# Patient Record
Sex: MALE | URBAN - METROPOLITAN AREA
[De-identification: names, ages, dates, MRNs, and addresses within clinical notes are randomized per-mention and may not be internally consistent; named-entity substitution may affect disease eponyms.]

---

## 2023-06-22 ENCOUNTER — INPATIENT (INPATIENT)
Facility: HOSPITAL | Age: 58
LOS: 0 days | Discharge: HOME | DRG: 287 | End: 2023-06-23
Attending: INTERNAL MEDICINE | Admitting: INTERNAL MEDICINE
Payer: MEDICAID

## 2023-06-22 VITALS
HEART RATE: 68 BPM | RESPIRATION RATE: 17 BRPM | OXYGEN SATURATION: 98 % | DIASTOLIC BLOOD PRESSURE: 82 MMHG | SYSTOLIC BLOOD PRESSURE: 127 MMHG | WEIGHT: 156.53 LBS

## 2023-06-22 DIAGNOSIS — R07.9 CHEST PAIN, UNSPECIFIED: ICD-10-CM

## 2023-06-22 LAB
ALBUMIN SERPL ELPH-MCNC: 4.1 G/DL — SIGNIFICANT CHANGE UP (ref 3.3–5)
ALP SERPL-CCNC: 124 U/L — HIGH (ref 40–120)
ALT FLD-CCNC: 38 U/L — SIGNIFICANT CHANGE UP (ref 10–45)
ANION GAP SERPL CALC-SCNC: 9 MMOL/L — SIGNIFICANT CHANGE UP (ref 5–17)
APTT BLD: 31.2 SEC — SIGNIFICANT CHANGE UP (ref 27.5–35.5)
AST SERPL-CCNC: 28 U/L — SIGNIFICANT CHANGE UP (ref 10–40)
BASOPHILS # BLD AUTO: 0.07 K/UL — SIGNIFICANT CHANGE UP (ref 0–0.2)
BASOPHILS NFR BLD AUTO: 1 % — SIGNIFICANT CHANGE UP (ref 0–2)
BILIRUB SERPL-MCNC: 0.4 MG/DL — SIGNIFICANT CHANGE UP (ref 0.2–1.2)
BUN SERPL-MCNC: 12 MG/DL — SIGNIFICANT CHANGE UP (ref 7–23)
CALCIUM SERPL-MCNC: 9.6 MG/DL — SIGNIFICANT CHANGE UP (ref 8.4–10.5)
CHLORIDE SERPL-SCNC: 102 MMOL/L — SIGNIFICANT CHANGE UP (ref 96–108)
CO2 SERPL-SCNC: 28 MMOL/L — SIGNIFICANT CHANGE UP (ref 22–31)
CREAT SERPL-MCNC: 0.93 MG/DL — SIGNIFICANT CHANGE UP (ref 0.5–1.3)
EGFR: 96 ML/MIN/1.73M2 — SIGNIFICANT CHANGE UP
EOSINOPHIL # BLD AUTO: 0.39 K/UL — SIGNIFICANT CHANGE UP (ref 0–0.5)
EOSINOPHIL NFR BLD AUTO: 5.4 % — SIGNIFICANT CHANGE UP (ref 0–6)
GLUCOSE BLDC GLUCOMTR-MCNC: 251 MG/DL — HIGH (ref 70–99)
GLUCOSE SERPL-MCNC: 109 MG/DL — HIGH (ref 70–99)
HCT VFR BLD CALC: 44.2 % — SIGNIFICANT CHANGE UP (ref 39–50)
HGB BLD-MCNC: 14.6 G/DL — SIGNIFICANT CHANGE UP (ref 13–17)
IMM GRANULOCYTES NFR BLD AUTO: 1.2 % — HIGH (ref 0–0.9)
INR BLD: 1.02 RATIO — SIGNIFICANT CHANGE UP (ref 0.88–1.16)
LYMPHOCYTES # BLD AUTO: 2.8 K/UL — SIGNIFICANT CHANGE UP (ref 1–3.3)
LYMPHOCYTES # BLD AUTO: 38.7 % — SIGNIFICANT CHANGE UP (ref 13–44)
MAGNESIUM SERPL-MCNC: 2 MG/DL — SIGNIFICANT CHANGE UP (ref 1.6–2.6)
MCHC RBC-ENTMCNC: 30.4 PG — SIGNIFICANT CHANGE UP (ref 27–34)
MCHC RBC-ENTMCNC: 33 GM/DL — SIGNIFICANT CHANGE UP (ref 32–36)
MCV RBC AUTO: 92.1 FL — SIGNIFICANT CHANGE UP (ref 80–100)
MONOCYTES # BLD AUTO: 0.61 K/UL — SIGNIFICANT CHANGE UP (ref 0–0.9)
MONOCYTES NFR BLD AUTO: 8.4 % — SIGNIFICANT CHANGE UP (ref 2–14)
NEUTROPHILS # BLD AUTO: 3.27 K/UL — SIGNIFICANT CHANGE UP (ref 1.8–7.4)
NEUTROPHILS NFR BLD AUTO: 45.3 % — SIGNIFICANT CHANGE UP (ref 43–77)
NRBC # BLD: 0 /100 WBCS — SIGNIFICANT CHANGE UP (ref 0–0)
NT-PROBNP SERPL-SCNC: 359 PG/ML — HIGH (ref 0–300)
PLATELET # BLD AUTO: 203 K/UL — SIGNIFICANT CHANGE UP (ref 150–400)
POTASSIUM SERPL-MCNC: 4.9 MMOL/L — SIGNIFICANT CHANGE UP (ref 3.5–5.3)
POTASSIUM SERPL-SCNC: 4.9 MMOL/L — SIGNIFICANT CHANGE UP (ref 3.5–5.3)
PROT SERPL-MCNC: 6.9 G/DL — SIGNIFICANT CHANGE UP (ref 6–8.3)
PROTHROM AB SERPL-ACNC: 11.7 SEC — SIGNIFICANT CHANGE UP (ref 10.5–13.4)
RBC # BLD: 4.8 M/UL — SIGNIFICANT CHANGE UP (ref 4.2–5.8)
RBC # FLD: 12.6 % — SIGNIFICANT CHANGE UP (ref 10.3–14.5)
SODIUM SERPL-SCNC: 139 MMOL/L — SIGNIFICANT CHANGE UP (ref 135–145)
TROPONIN T, HIGH SENSITIVITY RESULT: 24 NG/L — SIGNIFICANT CHANGE UP (ref 0–51)
WBC # BLD: 7.23 K/UL — SIGNIFICANT CHANGE UP (ref 3.8–10.5)
WBC # FLD AUTO: 7.23 K/UL — SIGNIFICANT CHANGE UP (ref 3.8–10.5)

## 2023-06-22 PROCEDURE — 93458 L HRT ARTERY/VENTRICLE ANGIO: CPT | Mod: 26

## 2023-06-22 PROCEDURE — 99152 MOD SED SAME PHYS/QHP 5/>YRS: CPT

## 2023-06-22 PROCEDURE — 99291 CRITICAL CARE FIRST HOUR: CPT

## 2023-06-22 RX ORDER — SODIUM CHLORIDE 9 MG/ML
1000 INJECTION, SOLUTION INTRAVENOUS
Refills: 0 | Status: DISCONTINUED | OUTPATIENT
Start: 2023-06-22 | End: 2023-06-23

## 2023-06-22 RX ORDER — HEPARIN SODIUM 5000 [USP'U]/ML
4000 INJECTION INTRAVENOUS; SUBCUTANEOUS EVERY 6 HOURS
Refills: 0 | Status: DISCONTINUED | OUTPATIENT
Start: 2023-06-22 | End: 2023-06-22

## 2023-06-22 RX ORDER — INSULIN LISPRO 100/ML
VIAL (ML) SUBCUTANEOUS AT BEDTIME
Refills: 0 | Status: DISCONTINUED | OUTPATIENT
Start: 2023-06-22 | End: 2023-06-23

## 2023-06-22 RX ORDER — BISOPROLOL FUMARATE 10 MG/1
0.5 TABLET, FILM COATED ORAL
Refills: 0 | DISCHARGE

## 2023-06-22 RX ORDER — OMEPRAZOLE 10 MG/1
1 CAPSULE, DELAYED RELEASE ORAL
Refills: 0 | DISCHARGE

## 2023-06-22 RX ORDER — DEXTROSE 50 % IN WATER 50 %
12.5 SYRINGE (ML) INTRAVENOUS ONCE
Refills: 0 | Status: DISCONTINUED | OUTPATIENT
Start: 2023-06-22 | End: 2023-06-23

## 2023-06-22 RX ORDER — DEXTROSE 50 % IN WATER 50 %
25 SYRINGE (ML) INTRAVENOUS ONCE
Refills: 0 | Status: DISCONTINUED | OUTPATIENT
Start: 2023-06-22 | End: 2023-06-23

## 2023-06-22 RX ORDER — HEPARIN SODIUM 5000 [USP'U]/ML
INJECTION INTRAVENOUS; SUBCUTANEOUS
Qty: 25000 | Refills: 0 | Status: DISCONTINUED | OUTPATIENT
Start: 2023-06-22 | End: 2023-06-22

## 2023-06-22 RX ORDER — SITAGLIPTIN 50 MG/1
1 TABLET, FILM COATED ORAL
Refills: 0 | DISCHARGE

## 2023-06-22 RX ORDER — ONDANSETRON 8 MG/1
4 TABLET, FILM COATED ORAL ONCE
Refills: 0 | Status: COMPLETED | OUTPATIENT
Start: 2023-06-22 | End: 2023-06-22

## 2023-06-22 RX ORDER — DEXTROSE 50 % IN WATER 50 %
15 SYRINGE (ML) INTRAVENOUS ONCE
Refills: 0 | Status: DISCONTINUED | OUTPATIENT
Start: 2023-06-22 | End: 2023-06-23

## 2023-06-22 RX ORDER — TICAGRELOR 90 MG/1
180 TABLET ORAL ONCE
Refills: 0 | Status: DISCONTINUED | OUTPATIENT
Start: 2023-06-22 | End: 2023-06-23

## 2023-06-22 RX ORDER — ASPIRIN/CALCIUM CARB/MAGNESIUM 324 MG
1 TABLET ORAL
Refills: 0 | DISCHARGE

## 2023-06-22 RX ORDER — HEPARIN SODIUM 5000 [USP'U]/ML
4000 INJECTION INTRAVENOUS; SUBCUTANEOUS ONCE
Refills: 0 | Status: DISCONTINUED | OUTPATIENT
Start: 2023-06-22 | End: 2023-06-22

## 2023-06-22 RX ORDER — ACETAMINOPHEN 500 MG
650 TABLET ORAL ONCE
Refills: 0 | Status: COMPLETED | OUTPATIENT
Start: 2023-06-22 | End: 2023-06-22

## 2023-06-22 RX ORDER — ATORVASTATIN CALCIUM 80 MG/1
80 TABLET, FILM COATED ORAL AT BEDTIME
Refills: 0 | Status: DISCONTINUED | OUTPATIENT
Start: 2023-06-22 | End: 2023-06-23

## 2023-06-22 RX ORDER — ASPIRIN/CALCIUM CARB/MAGNESIUM 324 MG
81 TABLET ORAL DAILY
Refills: 0 | Status: DISCONTINUED | OUTPATIENT
Start: 2023-06-22 | End: 2023-06-23

## 2023-06-22 RX ORDER — PANTOPRAZOLE SODIUM 20 MG/1
40 TABLET, DELAYED RELEASE ORAL
Refills: 0 | Status: DISCONTINUED | OUTPATIENT
Start: 2023-06-22 | End: 2023-06-23

## 2023-06-22 RX ORDER — INSULIN LISPRO 100/ML
VIAL (ML) SUBCUTANEOUS
Refills: 0 | Status: DISCONTINUED | OUTPATIENT
Start: 2023-06-22 | End: 2023-06-23

## 2023-06-22 RX ORDER — LOSARTAN POTASSIUM 100 MG/1
1 TABLET, FILM COATED ORAL
Refills: 0 | DISCHARGE

## 2023-06-22 RX ORDER — IBUPROFEN 200 MG
600 TABLET ORAL ONCE
Refills: 0 | Status: COMPLETED | OUTPATIENT
Start: 2023-06-22 | End: 2023-06-22

## 2023-06-22 RX ORDER — SODIUM CHLORIDE 9 MG/ML
250 INJECTION INTRAMUSCULAR; INTRAVENOUS; SUBCUTANEOUS ONCE
Refills: 0 | Status: COMPLETED | OUTPATIENT
Start: 2023-06-22 | End: 2023-06-22

## 2023-06-22 RX ORDER — LOSARTAN POTASSIUM 100 MG/1
50 TABLET, FILM COATED ORAL DAILY
Refills: 0 | Status: DISCONTINUED | OUTPATIENT
Start: 2023-06-22 | End: 2023-06-23

## 2023-06-22 RX ORDER — GLUCAGON INJECTION, SOLUTION 0.5 MG/.1ML
1 INJECTION, SOLUTION SUBCUTANEOUS ONCE
Refills: 0 | Status: DISCONTINUED | OUTPATIENT
Start: 2023-06-22 | End: 2023-06-23

## 2023-06-22 RX ADMIN — Medication 600 MILLIGRAM(S): at 18:46

## 2023-06-22 RX ADMIN — ONDANSETRON 4 MILLIGRAM(S): 8 TABLET, FILM COATED ORAL at 18:46

## 2023-06-22 RX ADMIN — Medication 600 MILLIGRAM(S): at 19:20

## 2023-06-22 RX ADMIN — Medication 650 MILLIGRAM(S): at 16:59

## 2023-06-22 RX ADMIN — SODIUM CHLORIDE 750 MILLILITER(S): 9 INJECTION INTRAMUSCULAR; INTRAVENOUS; SUBCUTANEOUS at 18:23

## 2023-06-22 RX ADMIN — ATORVASTATIN CALCIUM 80 MILLIGRAM(S): 80 TABLET, FILM COATED ORAL at 21:37

## 2023-06-22 RX ADMIN — Medication 1: at 21:38

## 2023-06-22 NOTE — ED ADULT NURSE NOTE - OBJECTIVE STATEMENT
Patient presents to Ed via amb with c/o chest pain. Per Ems patient has been experiencing chest pain x 1 month seen by Md today  and was sent to Ed due to STEMI. Patient A&Ox3 speaks Luxembourgish arrived with RAC 22g iv lock placed by RN at Md office. Patient given  4 baby aspirins and 2 nitro tabs per Ems. Patient arrived with Cardiology Md at bedside, LAC 18g iv lock placed labs drawn and sent.  Per Cardiology and Ed Md patient will receive Heparin bolus and drip by cath lab RN, medication at bedside given to Cardiologist.

## 2023-06-22 NOTE — H&P CARDIOLOGY - HISTORY OF PRESENT ILLNESS
58 year old Estonian speaking male with PMHx of CAD, prior MI in 1990s (medically managed), HTN, HLD, DM presented to ED with c/o intermittent left sided chest discomfort x2 months, radiating to left shoulder, worsening with exertion and with associated symptoms of dyspnea. Patient initially went to his cardiologist and in the office was found to have STEMI on EKG V3-V5 1-2 mm, given nitro x2 by EMS without improvement and sent for LHC. Patient is now s/p LHC showing mild LAD, RCA small diffuse disease via RRA. LVEDP 17, echo pending. Currently denies chest pain, palpitations, SOB, dizziness.    Cards:     ID:  58 year old male with PMHx of CAD, prior MI in 1990s (medically managed), HTN, HLD, DM presented to ED with c/o intermittent left sided chest discomfort radiating to his left shoulder x2 months, worsening with exertion and associated with symptoms of dyspnea. Patient initially went to his cardiologist Dr. Chen and in the office was found to have STEMI on EKG V3-V5 1-2 mm, given nitro x2 by EMS without improvement and transferred to [facility] ED for LHC. Patient is now s/p LHC showing mild LAD, RCA small diffuse disease via RRA, LVEDP 17, echo pending. Currently denies chest pain, palpitations, SOB, dizziness.    Cards: Angel Chen   58 year old male with PMHx of CAD, prior MI in 1990s (medically managed), HTN, HLD, DM presented to ED with c/o intermittent left sided chest discomfort radiating to his left shoulder x2 months, worsening with exertion and associated with symptoms of dyspnea. Patient initially went to his cardiologist Dr. Chen and in the office was found to have STEMI on EKG V3-V5 1-2 mm, given nitro x2 by EMS without improvement and transferred to [facility] ED for STEMI. Patient is now s/p LHC showing mild LAD, RCA small diffuse disease via RRA, LVEDP 17, echo pending. Currently denies chest pain, palpitations, SOB, dizziness.    Cards: Angel Chen

## 2023-06-22 NOTE — ED PROVIDER NOTE - OBJECTIVE STATEMENT
58-year-old male with past medical history of HTN, HLD, DM, CAD/MI (managed medically) presenting with chest pain.  Patient reports that he has had intermittent left-sided chest discomfort for 1 month chest pain has radiated to the left shoulder.  Over the past few days pain has been, constant.  Patient followed up with his cardiologist today and in the office was found to have a STEMI on EKG.  Patient given nitro x2 by EMS without improvement.  Patient otherwise denies fevers, shortness of breath, abdominal pain, nausea, vomiting, diarrhea, dysuria, diaphoresis.

## 2023-06-22 NOTE — ED PROVIDER NOTE - CRITICAL CARE ATTENDING CONTRIBUTION TO CARE
Hiren Girard MD:   I personally saw the patient and performed a substantive portion of the visit including all aspects of the medical decision making.    MDM: 58-year-old male with history as seen in HPI above, who was brought in by EMS from doctor's office for chest pain that is left-sided for 1 month, radiates to left shoulder, worsened over the last few days.  Cardiologist in office noted patient to have STEMI.  Patient was given nitroglycerin x2 and aspirin 324 mg prior to arrival.    ROS: patient denies fevers, chills, cough, shortness of breath, nausea, vomiting, diaphoresis, dizziness, syncope, leg pain/swelling, paresthesia, numbness, or weakness.    On examination, patient with elevated blood pressure, otherwise stable.  Cardiac RRR, lungs CTAB, abdomen soft nontender.  Neurovascular intact with equal pulses in all 4 extremities.  There is no calf tenderness or leg swelling. Neurovascularly intact bilaterally with soft compartments. Negative Cecille's sign.    Concern for STEMI, cardiology already at bedside upon arrival given prenotification by EMS.  Cardiology agreed with plan of care for Brilinta load and heparin load and drip.  Will obtain labs to evaluate for hematologic disorder, metabolic derangements, hepatic and renal function, and screen for infection. will obtain chest x-ray to evaluate for acute cardiopulmonary pathology.    The patient will need to be admitted to the hospital for continued evaluation and management.  Discussed with the accepting physician regarding the initial presentation, diagnostic studies, treatments given in the ED, and current plan of care.   The patient was accepted by and endorsed to the cardiology team, will follow up on pending labs and imaging.    Differential includes but is not limited to: See above    Patient with new problems requiring additional work-up and treatment, following orders: see above  Discussed case with: Cardiology  Obtained and reviewed external records: Outpatient notes from cardiologist  Additional history obtained from: EMS  Chronic conditions and social determinants of health affecting care: See above  Consideration of admission    Patient seen by myself and CEDRIC, but all critical care was provided by myself, the ED attending.     Critical Care Billing: STEMI  Upon my evaluation, this patient had a high probability of imminent or life-threatening deterioration, which required my direct attention, intervention, and personal management.  The patient has a  medical condition that impairs one or more vital organ systems.  Frequent personal assessment and adjustment of medical interventions was performed.      I have personally provided 35 minutes of critical care time exclusive of time spent on separately billable procedures. Time includes review of laboratory data, radiology results, discussion with consultants, patient and family; monitoring for potential decompensation, as well as time spent retrieving data and reviewing the chart and documenting the visit. Interventions were performed as documented above. Hiren Girard MD:   I personally saw the patient and performed a substantive portion of the visit including all aspects of the medical decision making.    Croatian Interpretation provided by [place] interpreters ID [id number]    MDM: 58-year-old male with history as seen in HPI above, who was brought in by EMS from doctor's office for chest pain that is left-sided for 1 month, radiates to left shoulder, worsened over the last few days.  Cardiologist in office noted patient to have STEMI.  Patient was given nitroglycerin x2 and aspirin 324 mg prior to arrival.    ROS: patient denies fevers, chills, cough, shortness of breath, nausea, vomiting, diaphoresis, dizziness, syncope, leg pain/swelling, paresthesia, numbness, or weakness.    On examination, patient with elevated blood pressure, otherwise stable.  Cardiac RRR, lungs CTAB, abdomen soft nontender.  Neurovascular intact with equal pulses in all 4 extremities.  There is no calf tenderness or leg swelling. Neurovascularly intact bilaterally with soft compartments. Negative Cecille's sign.    Concern for STEMI, cardiology already at bedside upon arrival given prenotification by EMS.  Cardiology agreed with plan of care for Brilinta load and heparin load and drip.  Will obtain labs to evaluate for hematologic disorder, metabolic derangements, hepatic and renal function, and screen for infection. will obtain chest x-ray to evaluate for acute cardiopulmonary pathology.    The patient will need to be admitted to the hospital for continued evaluation and management.  Discussed with the accepting physician regarding the initial presentation, diagnostic studies, treatments given in the ED, and current plan of care.   The patient was accepted by and endorsed to the cardiology team, will follow up on pending labs and imaging.    Differential includes but is not limited to: See above    Patient with new problems requiring additional work-up and treatment, following orders: see above  Discussed case with: Cardiology  Obtained and reviewed external records: Outpatient notes from cardiologist  Additional history obtained from: EMS  Chronic conditions and social determinants of health affecting care: See above  Consideration of admission    Patient seen by myself and CEDRIC, but all critical care was provided by myself, the ED attending.     Critical Care Billing: STEMI  Upon my evaluation, this patient had a high probability of imminent or life-threatening deterioration, which required my direct attention, intervention, and personal management.  The patient has a  medical condition that impairs one or more vital organ systems.  Frequent personal assessment and adjustment of medical interventions was performed.      I have personally provided 35 minutes of critical care time exclusive of time spent on separately billable procedures. Time includes review of laboratory data, radiology results, discussion with consultants, patient and family; monitoring for potential decompensation, as well as time spent retrieving data and reviewing the chart and documenting the visit. Interventions were performed as documented above. Hiren Girard MD:   I personally saw the patient and performed a substantive portion of the visit including all aspects of the medical decision making.    Pashto Interpretation provided by [place] interpreters ID [id number]    MDM: 58-year-old male with history as seen in HPI above, who was brought in by EMS from doctor's office for chest pain that is left-sided for 1 month, radiates to left shoulder, worsened over the last few days.  Cardiologist in office noted patient to have STEMI.  Patient was given nitroglycerin x2 and aspirin 324 mg prior to arrival.    ROS: patient denies fevers, chills, cough, shortness of breath, nausea, vomiting, diaphoresis, dizziness, syncope, leg pain/swelling, paresthesia, numbness, or weakness.    On examination, patient with elevated blood pressure, otherwise stable.  Cardiac RRR, lungs CTAB, abdomen soft nontender.  Neurovascular intact with equal pulses in all 4 extremities.  There is no calf tenderness or leg swelling. Neurovascularly intact bilaterally with soft compartments. Negative Cecille's sign.    Concern for STEMI, cardiology already at bedside upon arrival given prenotification by EMS.  Cardiology agreed with plan of care for Brilinta load and heparin load and drip.  Will obtain labs to evaluate for hematologic disorder, metabolic derangements, hepatic and renal function, and screen for infection. will obtain chest x-ray to evaluate for acute cardiopulmonary pathology.    My independent interpretation of the EKG shows:  Normal sinus rhythm with rate of 66 bpm, left axis deviation, (+) ST elevations noted, reciprocal ST depressions noted.    The patient will need to be admitted to the hospital for continued evaluation and management.  Discussed with the accepting physician regarding the initial presentation, diagnostic studies, treatments given in the ED, and current plan of care.   The patient was accepted by and endorsed to the cardiology team, will follow up on pending labs and imaging.    Differential includes but is not limited to: See above    Patient with new problems requiring additional work-up and treatment, following orders: see above  Discussed case with: Cardiology  Obtained and reviewed external records: Outpatient notes from cardiologist  Additional history obtained from: EMS  Chronic conditions and social determinants of health affecting care: See above  Consideration of admission    Patient seen by myself and CEDRIC, but all critical care was provided by myself, the ED attending.     Critical Care Billing: STEMI  Upon my evaluation, this patient had a high probability of imminent or life-threatening deterioration, which required my direct attention, intervention, and personal management.  The patient has a  medical condition that impairs one or more vital organ systems.  Frequent personal assessment and adjustment of medical interventions was performed.      I have personally provided 35 minutes of critical care time exclusive of time spent on separately billable procedures. Time includes review of laboratory data, radiology results, discussion with consultants, patient and family; monitoring for potential decompensation, as well as time spent retrieving data and reviewing the chart and documenting the visit. Interventions were performed as documented above.

## 2023-06-22 NOTE — ED ADULT NURSE NOTE - NSFALLRISKINTERV_ED_ALL_ED
Assistance OOB with selected safe patient handling equipment if applicable/Communicate fall risk and risk factors to all staff, patient, and family/Provide patient with walking aids/Provide visual cue: yellow wristband, yellow gown, etc/Reinforce activity limits and safety measures with patient and family/Call bell, personal items and telephone in reach/Instruct patient to call for assistance before getting out of bed/chair/stretcher/Non-slip footwear applied when patient is off stretcher/[place] to call system/Physically safe environment - no spills, clutter or unnecessary equipment/Purposeful Proactive Rounding/Room/bathroom lighting operational, light cord in reach

## 2023-06-22 NOTE — CONSULT NOTE ADULT - ASSESSMENT
59yo with PMHx HTN, HLD, Dm, prior MI in the 1990s (managed medically) who presents with 2 months of chest pain  Does not meet STEMI criteria but high risk and pretest probability     Impression:  Near STEMI criteria  HTN HLD DM     Plan:  Dapt load with aspirin brillinta  Hep bolus and gtt  atorva 80   BB and arb in the next 24h if hemodynamically favorable  TTE   A1c, lipid panel tsh for risk stratification  To Cath lab (Dr. Aguillon)

## 2023-06-22 NOTE — CONSULT NOTE ADULT - SUBJECTIVE AND OBJECTIVE BOX
Registration Time: 14:02  Initial EC:10pm  Called by ED: N/a  Saw patient at bedside: 1:50pm  Called Cath Attendin:00  Balloon/device time:    Reason for consult: STEMI    HPI:  Estonian speaking, telephone     57yo with PMHx HTN, HLD, Dm, prior MI in the  (managed medically) who presents with 2 months of chest pain. It is substernal worsening with exertion, with radiation down his left arm and associatd with dyspnea.  Saw his cardiologist earlier today and found to have STEMI on EKG V3-V5 1-2mm and thus triaged to [facility] ED    HR 68 -151/85 satting 100% Ra    PMHx/ as above    Home Meds:  Januvia, metformin  bisoprolol aspirin losartan  omeprazole     Current Meds:   heparin   Injectable 4000 Unit(s) IV Push once  heparin   Injectable 4000 Unit(s) IV Push every 6 hours PRN  heparin  Infusion.  Unit(s)/Hr IV Continuous <Continuous>  ticagrelor 180 milliGRAM(s) Oral Once    Allergies:  No Known Allergies    Review of Systems:14pt ros neg unless stated above    Physical Exam:  T(F): --  HR: 68 (06-22) (68 - 68)  BP: 151/85 (06-22) (127/82 - 151/85)  RR: 20 (06-22)  SpO2: 98% (-)  GENERAL: No acute distress, well-developed  HEAD:  Atraumatic, Normocephalic  ENT: EOMI, PERRLA, conjunctiva and sclera clear, Neck supple, No JVD, moist mucosa  CHEST/LUNG: Clear to auscultation bilaterally; No wheeze, equal breath sounds bilaterally   BACK: No spinal tenderness  HEART: Regular rate and rhythm; No murmurs, rubs, or gallops  ABDOMEN: Soft, Nontender, Nondistended; Bowel sounds present  EXTREMITIES:  No clubbing, cyanosis, or edema  PSYCH: Nl behavior, nl affect  NEUROLOGY: AAOx3, non-focal, cranial nerves intact  SKIN: Normal color, No rashes or lesions  LINES:    Cardiovascular Diagnostic Testing:    ECG: office:  sinus rhythm, normal axis, 1-2mm john in V3-V4 and TWI in leads V5-V6    Here  sinus rhythm, normal adxis, qbiphasic T wave in V2, TWI in the lateral leads                        14.6   7.23  )-----------(  14:38 )             44.2

## 2023-06-23 VITALS
SYSTOLIC BLOOD PRESSURE: 128 MMHG | RESPIRATION RATE: 17 BRPM | HEART RATE: 62 BPM | DIASTOLIC BLOOD PRESSURE: 70 MMHG | OXYGEN SATURATION: 98 % | TEMPERATURE: 98 F

## 2023-06-23 DIAGNOSIS — I21.3 ST ELEVATION (STEMI) MYOCARDIAL INFARCTION OF UNSPECIFIED SITE: ICD-10-CM

## 2023-06-23 DIAGNOSIS — I10 ESSENTIAL (PRIMARY) HYPERTENSION: ICD-10-CM

## 2023-06-23 DIAGNOSIS — E78.5 HYPERLIPIDEMIA, UNSPECIFIED: ICD-10-CM

## 2023-06-23 DIAGNOSIS — E11.9 TYPE 2 DIABETES MELLITUS WITHOUT COMPLICATIONS: ICD-10-CM

## 2023-06-23 LAB
A1C WITH ESTIMATED AVERAGE GLUCOSE RESULT: 8.8 % — HIGH (ref 4–5.6)
CHOLEST SERPL-MCNC: 154 MG/DL — SIGNIFICANT CHANGE UP
ESTIMATED AVERAGE GLUCOSE: 206 MG/DL — HIGH (ref 68–114)
GLUCOSE BLDC GLUCOMTR-MCNC: 132 MG/DL — HIGH (ref 70–99)
GLUCOSE BLDC GLUCOMTR-MCNC: 175 MG/DL — HIGH (ref 70–99)
GLUCOSE BLDC GLUCOMTR-MCNC: 95 MG/DL — SIGNIFICANT CHANGE UP (ref 70–99)
HDLC SERPL-MCNC: 36 MG/DL — LOW
LIPID PNL WITH DIRECT LDL SERPL: 92 MG/DL — SIGNIFICANT CHANGE UP
NON HDL CHOLESTEROL: 118 MG/DL — SIGNIFICANT CHANGE UP
TRIGL SERPL-MCNC: 127 MG/DL — SIGNIFICANT CHANGE UP
TSH SERPL-MCNC: 2.72 UIU/ML — SIGNIFICANT CHANGE UP (ref 0.27–4.2)

## 2023-06-23 PROCEDURE — 93306 TTE W/DOPPLER COMPLETE: CPT | Mod: 26

## 2023-06-23 PROCEDURE — 84443 ASSAY THYROID STIM HORMONE: CPT

## 2023-06-23 PROCEDURE — 99291 CRITICAL CARE FIRST HOUR: CPT

## 2023-06-23 PROCEDURE — 83880 ASSAY OF NATRIURETIC PEPTIDE: CPT

## 2023-06-23 PROCEDURE — C1887: CPT

## 2023-06-23 PROCEDURE — 85025 COMPLETE CBC W/AUTO DIFF WBC: CPT

## 2023-06-23 PROCEDURE — 86900 BLOOD TYPING SEROLOGIC ABO: CPT

## 2023-06-23 PROCEDURE — 80053 COMPREHEN METABOLIC PANEL: CPT

## 2023-06-23 PROCEDURE — 86850 RBC ANTIBODY SCREEN: CPT

## 2023-06-23 PROCEDURE — 80061 LIPID PANEL: CPT

## 2023-06-23 PROCEDURE — 85730 THROMBOPLASTIN TIME PARTIAL: CPT

## 2023-06-23 PROCEDURE — 85610 PROTHROMBIN TIME: CPT

## 2023-06-23 PROCEDURE — 76376 3D RENDER W/INTRP POSTPROCES: CPT | Mod: 26

## 2023-06-23 PROCEDURE — 93458 L HRT ARTERY/VENTRICLE ANGIO: CPT

## 2023-06-23 PROCEDURE — 82962 GLUCOSE BLOOD TEST: CPT

## 2023-06-23 PROCEDURE — 93356 MYOCRD STRAIN IMG SPCKL TRCK: CPT

## 2023-06-23 PROCEDURE — 83036 HEMOGLOBIN GLYCOSYLATED A1C: CPT

## 2023-06-23 PROCEDURE — C1894: CPT

## 2023-06-23 PROCEDURE — 76376 3D RENDER W/INTRP POSTPROCES: CPT

## 2023-06-23 PROCEDURE — 83735 ASSAY OF MAGNESIUM: CPT

## 2023-06-23 PROCEDURE — 84484 ASSAY OF TROPONIN QUANT: CPT

## 2023-06-23 PROCEDURE — C1769: CPT

## 2023-06-23 PROCEDURE — 86901 BLOOD TYPING SEROLOGIC RH(D): CPT

## 2023-06-23 PROCEDURE — 93306 TTE W/DOPPLER COMPLETE: CPT

## 2023-06-23 PROCEDURE — 99232 SBSQ HOSP IP/OBS MODERATE 35: CPT

## 2023-06-23 RX ORDER — ATORVASTATIN CALCIUM 80 MG/1
1 TABLET, FILM COATED ORAL
Qty: 30 | Refills: 0
Start: 2023-06-23 | End: 2023-07-22

## 2023-06-23 RX ORDER — METFORMIN HYDROCHLORIDE 850 MG/1
1 TABLET ORAL
Qty: 0 | Refills: 0 | DISCHARGE

## 2023-06-23 RX ADMIN — PANTOPRAZOLE SODIUM 40 MILLIGRAM(S): 20 TABLET, DELAYED RELEASE ORAL at 05:45

## 2023-06-23 RX ADMIN — Medication 81 MILLIGRAM(S): at 05:45

## 2023-06-23 RX ADMIN — LOSARTAN POTASSIUM 50 MILLIGRAM(S): 100 TABLET, FILM COATED ORAL at 05:45

## 2023-06-23 NOTE — DISCHARGE NOTE PROVIDER - NSDCCPTREATMENT_GEN_ALL_CORE_FT
PRINCIPAL PROCEDURE  Procedure: Left heart cardiac cath  Findings and Treatment: Diagnostic cardiac cath, no intervention, via right radial artery     PRINCIPAL PROCEDURE  Procedure: Left heart cardiac cath  Findings and Treatment: Diagnostic cardiac cath, no intervention, mild LAD, RCA small diffuse disease  via right radial artery

## 2023-06-23 NOTE — DISCHARGE NOTE PROVIDER - NSDCCPCAREPLAN_GEN_ALL_CORE_FT
PRINCIPAL DISCHARGE DIAGNOSIS  Diagnosis: ST elevation MI (STEMI)  Assessment and Plan of Treatment: No heavy lifting or pushing/pulling with procedure arm for 2 weeks. No driving for 2 days. You may shower 24 hours following the procedure but avoid baths/swimming for 1 week. Check your wrist site for bleeding and/or swelling daily following procedure and call your doctor immediately if it occurs or if you experience increased pain at the site. Follow up with your cardiologist in 1-2 weeks. You may call [facility] if you have any questions/concerns regarding your procedure [phone].      SECONDARY DISCHARGE DIAGNOSES  Diagnosis: HLD (hyperlipidemia)  Assessment and Plan of Treatment: Continue with your cholesterol medications. Eat a heart healthy diet that is low in saturated fats and salt, and includes whole grains, fruits, vegetables and lean protein; exercise regularly (consult with your physician or cardiologist first); maintain a heart healthy weight; if you smoke - quit (A resource to help you stop smoking is the [facility] for Tobacco Control – phone number [phone].). Continue to follow with your primary physician or cardiologist.    Diagnosis: HTN (hypertension)  Assessment and Plan of Treatment: Continue with your blood pressure medications; eat a heart healthy diet with low salt diet; exercise regularly (consult with your physician or cardiologist first); maintain a heart healthy weight; if you smoke - quit (A resource to help you stop smoking is the [facility] [employer] Control – phone number [phone].); include healthy ways to manage stress. Continue to follow with your primary care physician or cardiologist.    Diagnosis: DM (diabetes mellitus)  Assessment and Plan of Treatment: Your Hemoglobin A1c level is   Continue to follow with your primary care MD or your endocrinologist.  Follow a heart healthy diabetic diet. If you check your fingerstick glucose at home, call your MD if it is greater than 250mg/dL on 2 occasions or less than 100mg/dL on 2 occasions. Know signs of low blood sugar, such as: dizziness, shakiness, sweating, confusion, hunger, nervousness-drink 4 ounces apple juice if occurs and call your doctor. Know early signs of high blood sugar, such as: frequent urination, increased thirst, blurry vision, fatigue, headache - call your doctor if this occurs. Follow with other practitioners to care for your diabetes, such as ophthalmologist and podiatrist.     PRINCIPAL DISCHARGE DIAGNOSIS  Diagnosis: ST elevation MI (STEMI)  Assessment and Plan of Treatment: You did not have a heart attack.  Your cardiac catheterization revealed mild Coronary artery disease.      SECONDARY DISCHARGE DIAGNOSES  Diagnosis: HTN (hypertension)  Assessment and Plan of Treatment: Continue with your blood pressure medications; eat a heart healthy diet with low salt diet; exercise regularly (consult with your physician or cardiologist first); maintain a heart healthy weight; if you smoke - quit (A resource to help you stop smoking is the [facility] [employer] Control – phone number [phone].); include healthy ways to manage stress. Continue to follow with your primary care physician or cardiologist.    Diagnosis: HLD (hyperlipidemia)  Assessment and Plan of Treatment: Continue with your cholesterol medications. Eat a heart healthy diet that is low in saturated fats and salt, and includes whole grains, fruits, vegetables and lean protein; exercise regularly (consult with your physician or cardiologist first); maintain a heart healthy weight; if you smoke - quit (A resource to help you stop smoking is the [facility] for [employer] Control – phone number [phone].). Continue to follow with your primary physician or cardiologist.    Diagnosis: DM (diabetes mellitus)  Assessment and Plan of Treatment: Your Hemoglobin A1c level is   Continue to follow with your primary care MD or your endocrinologist.  Follow a heart healthy diabetic diet. If you check your fingerstick glucose at home, call your MD if it is greater than 250mg/dL on 2 occasions or less than 100mg/dL on 2 occasions. Know signs of low blood sugar, such as: dizziness, shakiness, sweating, confusion, hunger, nervousness-drink 4 ounces apple juice if occurs and call your doctor. Know early signs of high blood sugar, such as: frequent urination, increased thirst, blurry vision, fatigue, headache - call your doctor if this occurs. Follow with other practitioners to care for your diabetes, such as ophthalmologist and podiatrist.    Diagnosis: CAD (coronary artery disease)  Assessment and Plan of Treatment: Low salt, low fat diet.   Weight management.   Take medications as prescribed.    No smoking.  Follow up appointments with your doctor(s)  as instructed.

## 2023-06-23 NOTE — DISCHARGE NOTE NURSING/CASE MANAGEMENT/SOCIAL WORK - PATIENT PORTAL LINK FT
You can access the FollowMyHealth Patient Portal offered by [facility] by registering at the following website: [email or web address]. By joining [facility]’s FollowMyHealth portal, you will also be able to view your health information using other applications (apps) compatible with our system.

## 2023-06-23 NOTE — DISCHARGE NOTE PROVIDER - NSDCMRMEDTOKEN_GEN_ALL_CORE_FT
aspirin 81 mg oral delayed release tablet: 1 tab(s) orally once a day  atorvastatin 80 mg oral tablet: 1 tab(s) orally once a day (at bedtime)  bisoprolol 5 mg oral tablet: 0.5 tab(s) orally once a day  glipiZIDE 10 mg oral tablet: 1 tab(s) orally 2 times a day  Januvia 100 mg oral tablet: 1 tab(s) orally once a day  losartan 50 mg oral tablet: 1 tab(s) orally once a day  metFORMIN 850 mg oral tablet: 1 tab(s) orally 2 times a day DO NOT TAKE on 6/23 or 6/24, restart on Sunday 6/25.  omeprazole 20 mg oral delayed release tablet: 1 tab(s) orally once a day

## 2023-06-23 NOTE — DISCHARGE NOTE PROVIDER - CARE PROVIDER_API CALL
Angel Chen  Cardiovascular Disease  [address] [facility] [facility] [address]  [place] [id number]  Phone: [phone]  Fax: [phone]  Follow Up Time: 2 weeks

## 2023-06-23 NOTE — PROGRESS NOTE ADULT - SUBJECTIVE AND OBJECTIVE BOX
HPI:  58 year old male with PMHx of CAD, prior MI in  (medically managed), HTN, HLD, DM presented to ED with c/o intermittent left sided chest discomfort radiating to his left shoulder x2 months, worsening with exertion and associated with symptoms of dyspnea. Patient initially went to his cardiologist Dr. Chen and in the office was found to have STEMI on EKG V3-V5 1-2 mm, given nitro x2 by EMS without improvement and transferred to [facility] ED for STEMI. Patient is now s/p LHC showing mild LAD, RCA small diffuse disease via RRA, LVEDP 17, echo pending. Currently denies chest pain, palpitations, SOB, dizziness.    Cards: Angel Chen   (2023 16:26)    Patient is a 58y old  Male who presents with a chief complaint of         Allergies    No Known Allergies    Intolerances        Medications:  aspirin enteric coated 81 milliGRAM(s) Oral daily  atorvastatin 80 milliGRAM(s) Oral at bedtime  dextrose 5%. 1000 milliLiter(s) IV Continuous <Continuous>  dextrose 5%. 1000 milliLiter(s) IV Continuous <Continuous>  dextrose 50% Injectable 25 Gram(s) IV Push once  dextrose 50% Injectable 25 Gram(s) IV Push once  dextrose 50% Injectable 12.5 Gram(s) IV Push once  dextrose Oral Gel 15 Gram(s) Oral once PRN  glucagon  Injectable 1 milliGRAM(s) IntraMuscular once  insulin lispro (ADMELOG) corrective regimen sliding scale   SubCutaneous three times a day before meals  insulin lispro (ADMELOG) corrective regimen sliding scale   SubCutaneous at bedtime  losartan 50 milliGRAM(s) Oral daily  pantoprazole    Tablet 40 milliGRAM(s) Oral before breakfast  ticagrelor 180 milliGRAM(s) Oral Once      Vitals:  T(C): 36.4 (23 @ 00:39), Max: 36.7 (23 @ 16:26)  HR: 66 (23 @ 00:39) (60 - 70)  BP: 109/55 (23 @ 00:39) (107/62 - 151/85)  BP(mean): 67 (23 @ 00:39) (67 - 113)  RR: 17 (23 @ 00:39) (15 - 20)  SpO2: 97% (23 @ 00:39) (93% - 98%)  Wt(kg): --  Daily Height in cm: 170 (2023 16:26)    Daily Weight in k (2023 16:26)  I&O's Summary    2023 07:01  -  2023 02:21  --------------------------------------------------------  IN: 120 mL / OUT: 1300 mL / NET: -1180 mL          Physical Exam:  Appearance: Normal  Eyes: PERRL, EOMI  HENT: Normal oral mus[place]a, NC/AT  Cardiovascular: S1S2, RRR, No M/R/G, no JVD, No Lower extremity edema  Procedural Access Site: No hematoma, Non-tender to palpation, 2+ pulse, No bruit, No Ecchymosis  Respiratory: Clear to auscultation bilaterally  Gastrointestinal: Soft, Non tender, Normal Bowel Sounds  Musculoskeletal: No clubbing, No joint deformity   Neurologic: Non-focal  Lymphatic: No lymphadenopathy  Psychiatry: AAOx3, Mood & affect appropriate  Skin: No rashes, No ecchymoses, No cyanosis        139  |  102  |  12  ----------------------------<  109<H>  4.9   |  28  |  0.93    Ca    9.6      2023 14:38  Mg     2.0         TPro  6.9  /  Alb  4.1  /  TBili  0.4  /  DBili  x   /  AST  28  /  ALT  38  /  AlkPhos  124<H>      PT/INR - ( 2023 14:38 )   PT: 11.7 sec;   INR: 1.02 ratio         PTT - ( 2023 14:38 )  PTT:31.2 sec        Lipid panel   Hgb A1c                         14.6   7.23  )-----------( 203      ( 2023 14:38 )             44.2         ECG: SR 66 bpm

## 2023-06-23 NOTE — DISCHARGE NOTE PROVIDER - HOSPITAL COURSE
HPI:  58 year old male with PMHx of CAD, prior MI in 1990s (medically managed), HTN, HLD, DM presented to ED with c/o intermittent left sided chest discomfort radiating to his left shoulder x2 months, worsening with exertion and associated with symptoms of dyspnea. Patient initially went to his cardiologist Dr. Chen and in the office was found to have STEMI on EKG V3-V5 1-2 mm, given nitro x2 by EMS without improvement and transferred to [facility] ED for STEMI. Patient is now s/p LHC showing mild LAD, RCA small diffuse disease via RRA, LVEDP 17, echo pending. Currently denies chest pain, palpitations, SOB, dizziness.    Cards: Angel Chen   (22 Jun 2023 16:26)    6/22 diagnostic cardiac cath, no intervention. Right radial artery site without swelling, bleeding.   HPI:  58 year old male with PMHx of CAD, prior MI in 1990s (medically managed), HTN, HLD, DM presented to ED with c/o intermittent left sided chest discomfort radiating to his left shoulder x2 months, worsening with exertion and associated with symptoms of dyspnea. Patient initially went to his cardiologist Dr. Chen and in the office was found to have STEMI on EKG V3-V5 1-2 mm, given nitro x2 by EMS without improvement and transferred to [facility] ED for STEMI. Patient is now s/p LHC showing mild LAD, RCA small diffuse disease via RRA, LVEDP 17, echo pending. Currently denies chest pain, palpitations, SOB, dizziness.    Cards: Angel Chen   (22 Jun 2023 16:26)    6/22 diagnostic cardiac cath, no intervention. Right radial artery site without swelling, bleeding.    6/23 Pt stable with no tele events had reported migraine HA post procedure  treated with Ibuprofen with no reports of residual HA today.  No site complications with normal Echo results.  Discussed with Dr. Henna au for d/c home and f/u with Dr. Chen in 2 weeks post discharge.

## 2023-07-04 NOTE — PATIENT PROFILE ADULT - FALL HARM RISK - UNIVERSAL INTERVENTIONS
Bed in lowest position, wheels locked, appropriate side rails in place/Call bell, personal items and telephone in reach/Instruct patient to call for assistance before getting out of bed or chair/Non-slip footwear when patient is out of bed/[place] to call system/Physically safe environment - no spills, clutter or unnecessary equipment/Purposeful Proactive Rounding/Room/bathroom lighting operational, light cord in reach Bed/Stretcher in lowest position, wheels locked, appropriate side rails in place/Call bell, personal items and telephone in reach/Instruct patient to call for assistance before getting out of bed/chair/stretcher/Non-slip footwear applied when patient is off stretcher/[place] to call system/Physically safe environment - no spills, clutter or unnecessary equipment/Purposeful proactive rounding/Room/bathroom lighting operational, light cord in reach